# Patient Record
Sex: FEMALE | Race: WHITE | URBAN - METROPOLITAN AREA
[De-identification: names, ages, dates, MRNs, and addresses within clinical notes are randomized per-mention and may not be internally consistent; named-entity substitution may affect disease eponyms.]

---

## 2017-04-03 ENCOUNTER — HISTORICAL (OUTPATIENT)
Dept: HEMATOLOGY/ONCOLOGY | Facility: CLINIC | Age: 62
End: 2017-04-03

## 2017-04-14 ENCOUNTER — HISTORICAL (OUTPATIENT)
Dept: ADMINISTRATIVE | Facility: HOSPITAL | Age: 62
End: 2017-04-14

## 2017-07-05 ENCOUNTER — HISTORICAL (OUTPATIENT)
Dept: RADIOLOGY | Facility: HOSPITAL | Age: 62
End: 2017-07-05

## 2017-07-11 ENCOUNTER — HISTORICAL (OUTPATIENT)
Dept: ADMINISTRATIVE | Facility: HOSPITAL | Age: 62
End: 2017-07-11

## 2017-08-15 ENCOUNTER — HISTORICAL (OUTPATIENT)
Dept: RADIOLOGY | Facility: HOSPITAL | Age: 62
End: 2017-08-15

## 2017-10-05 ENCOUNTER — HISTORICAL (OUTPATIENT)
Dept: ADMINISTRATIVE | Facility: HOSPITAL | Age: 62
End: 2017-10-05

## 2017-10-05 LAB
ABS NEUT (OLG): 5.23 X10(3)/MCL (ref 2.1–9.2)
ALBUMIN SERPL-MCNC: 3.6 GM/DL (ref 3.4–5)
ALBUMIN/GLOB SERPL: 1.2 {RATIO}
ALP SERPL-CCNC: 210 UNIT/L (ref 38–126)
ALT SERPL-CCNC: 19 UNIT/L (ref 12–78)
AST SERPL-CCNC: 9 UNIT/L (ref 15–37)
BASOPHILS # BLD AUTO: 0.1 X10(3)/MCL (ref 0–0.2)
BASOPHILS NFR BLD AUTO: 0.7 %
BILIRUB SERPL-MCNC: 0.4 MG/DL (ref 0.2–1)
BILIRUBIN DIRECT+TOT PNL SERPL-MCNC: 0.1 MG/DL (ref 0–0.2)
BILIRUBIN DIRECT+TOT PNL SERPL-MCNC: 0.3 MG/DL (ref 0–0.8)
BUN SERPL-MCNC: 25 MG/DL (ref 7–18)
CALCIUM SERPL-MCNC: 9.2 MG/DL (ref 8.5–10.1)
CHLORIDE SERPL-SCNC: 99 MMOL/L (ref 98–107)
CO2 SERPL-SCNC: 22 MMOL/L (ref 21–32)
CREAT SERPL-MCNC: 1.47 MG/DL (ref 0.55–1.02)
EOSINOPHIL # BLD AUTO: 0.3 X10(3)/MCL (ref 0–0.9)
EOSINOPHIL NFR BLD AUTO: 3.7 %
ERYTHROCYTE [DISTWIDTH] IN BLOOD BY AUTOMATED COUNT: 14.4 % (ref 11.5–17)
FERRITIN SERPL-MCNC: 593.4 NG/ML (ref 8–388)
FOLATE SERPL-MCNC: 6.8 NG/ML (ref 3.1–17.5)
GLOBULIN SER-MCNC: 3 GM/DL (ref 2.4–3.5)
GLUCOSE SERPL-MCNC: 431 MG/DL (ref 74–106)
HCT VFR BLD AUTO: 33.4 % (ref 37–47)
HGB BLD-MCNC: 10.9 GM/DL (ref 12–16)
IRON SATN MFR SERPL: 21.3 % (ref 20–50)
IRON SERPL-MCNC: 60 MCG/DL (ref 50–175)
LYMPHOCYTES # BLD AUTO: 2 X10(3)/MCL (ref 0.6–4.6)
LYMPHOCYTES NFR BLD AUTO: 23.5 %
MCH RBC QN AUTO: 28.1 PG (ref 27–31)
MCHC RBC AUTO-ENTMCNC: 32.6 GM/DL (ref 33–36)
MCV RBC AUTO: 86.1 FL (ref 80–94)
MONOCYTES # BLD AUTO: 0.8 X10(3)/MCL (ref 0.1–1.3)
MONOCYTES NFR BLD AUTO: 9.6 %
NEUTROPHILS # BLD AUTO: 5.2 X10(3)/MCL (ref 2.1–9.2)
NEUTROPHILS NFR BLD AUTO: 62.5 %
PLATELET # BLD AUTO: 228 X10(3)/MCL (ref 130–400)
PMV BLD AUTO: 10.8 FL (ref 9.4–12.4)
POTASSIUM SERPL-SCNC: 4.7 MMOL/L (ref 3.5–5.1)
PROT SERPL-MCNC: 6.6 GM/DL (ref 6.4–8.2)
RBC # BLD AUTO: 3.88 X10(6)/MCL (ref 4.2–5.4)
SODIUM SERPL-SCNC: 132 MMOL/L (ref 136–145)
TIBC SERPL-MCNC: 282 MCG/DL (ref 250–450)
TRANSFERRIN SERPL-MCNC: 228 MG/DL (ref 200–360)
VIT B12 SERPL-MCNC: 346 PG/ML (ref 193–986)
WBC # SPEC AUTO: 8.4 X10(3)/MCL (ref 4.5–11.5)

## 2017-10-20 ENCOUNTER — HISTORICAL (OUTPATIENT)
Dept: RADIOLOGY | Facility: HOSPITAL | Age: 62
End: 2017-10-20

## 2017-12-01 ENCOUNTER — HISTORICAL (OUTPATIENT)
Dept: ADMINISTRATIVE | Facility: HOSPITAL | Age: 62
End: 2017-12-01

## 2017-12-01 LAB
ALBUMIN SERPL-MCNC: 3.4 GM/DL (ref 3.4–5)
ALBUMIN/GLOB SERPL: 1 RATIO (ref 1.1–2)
ALP SERPL-CCNC: 209 UNIT/L (ref 38–126)
ALT SERPL-CCNC: 20 UNIT/L (ref 12–78)
AST SERPL-CCNC: 15 UNIT/L (ref 15–37)
BILIRUB SERPL-MCNC: 0.3 MG/DL (ref 0.2–1)
BILIRUBIN DIRECT+TOT PNL SERPL-MCNC: 0.1 MG/DL (ref 0–0.5)
BILIRUBIN DIRECT+TOT PNL SERPL-MCNC: 0.2 MG/DL (ref 0–0.8)
BUN SERPL-MCNC: 22 MG/DL (ref 7–18)
CALCIUM SERPL-MCNC: 9 MG/DL (ref 8.5–10.1)
CHLORIDE SERPL-SCNC: 106 MMOL/L (ref 98–107)
CO2 SERPL-SCNC: 20 MMOL/L (ref 21–32)
CREAT SERPL-MCNC: 1.44 MG/DL (ref 0.55–1.02)
ERYTHROCYTE [DISTWIDTH] IN BLOOD BY AUTOMATED COUNT: 14.5 % (ref 11.5–17)
GLOBULIN SER-MCNC: 3.4 GM/DL (ref 2.4–3.5)
GLUCOSE SERPL-MCNC: 254 MG/DL (ref 74–106)
HCT VFR BLD AUTO: 35.7 % (ref 37–47)
HGB BLD-MCNC: 11.5 GM/DL (ref 12–16)
MCH RBC QN AUTO: 27.4 PG (ref 27–31)
MCHC RBC AUTO-ENTMCNC: 32.2 GM/DL (ref 33–36)
MCV RBC AUTO: 85 FL (ref 80–94)
PLATELET # BLD AUTO: 225 X10(3)/MCL (ref 130–400)
PMV BLD AUTO: 10.6 FL (ref 9.4–12.4)
POTASSIUM SERPL-SCNC: 4.3 MMOL/L (ref 3.5–5.1)
PROT SERPL-MCNC: 6.8 GM/DL (ref 6.4–8.2)
RBC # BLD AUTO: 4.2 X10(6)/MCL (ref 4.2–5.4)
SODIUM SERPL-SCNC: 139 MMOL/L (ref 136–145)
WBC # SPEC AUTO: 8.6 X10(3)/MCL (ref 4.5–11.5)

## 2017-12-19 ENCOUNTER — HISTORICAL (OUTPATIENT)
Dept: ADMINISTRATIVE | Facility: HOSPITAL | Age: 62
End: 2017-12-19

## 2018-04-23 ENCOUNTER — HISTORICAL (OUTPATIENT)
Dept: ADMINISTRATIVE | Facility: HOSPITAL | Age: 63
End: 2018-04-23

## 2018-04-23 LAB
ABS NEUT (OLG): 9.9 X10(3)/MCL (ref 2.1–9.2)
ALBUMIN SERPL-MCNC: 3.3 GM/DL (ref 3.4–5)
ALBUMIN/GLOB SERPL: 1 {RATIO}
ALP SERPL-CCNC: 198 UNIT/L (ref 45–117)
ALT SERPL-CCNC: 20 UNIT/L (ref 13–56)
AST SERPL-CCNC: 13 UNIT/L (ref 15–37)
BASOPHILS # BLD AUTO: 0.07 X10(3)/MCL (ref 0–0.2)
BASOPHILS NFR BLD AUTO: 0.6 % (ref 0–1)
BILIRUB SERPL-MCNC: 0.4 MG/DL (ref 0.2–1)
BILIRUBIN DIRECT+TOT PNL SERPL-MCNC: <0.1 MG/DL (ref 0–0.2)
BILIRUBIN DIRECT+TOT PNL SERPL-MCNC: >0.3 MG/DL (ref 0–1)
BUN SERPL-MCNC: 20 MG/DL (ref 7–18)
CALCIUM SERPL-MCNC: 9.4 MG/DL (ref 8.5–10.1)
CHLORIDE SERPL-SCNC: 104 MMOL/L (ref 98–107)
CO2 SERPL-SCNC: 21 MMOL/L (ref 21–32)
CREAT SERPL-MCNC: 1.53 MG/DL (ref 0.55–1.02)
DEPRECATED CALCIDIOL+CALCIFEROL SERPL-MC: 7 NG/ML (ref 30–80)
EOSINOPHIL # BLD AUTO: 0.01 X10(3)/MCL (ref 0–0.9)
EOSINOPHIL NFR BLD AUTO: 0.1 % (ref 0–6.4)
ERYTHROCYTE [DISTWIDTH] IN BLOOD BY AUTOMATED COUNT: 14.5 % (ref 11.5–17)
ERYTHROCYTE [SEDIMENTATION RATE] IN BLOOD: 54 MM/HR (ref 0–30)
GLOBULIN SER-MCNC: 4 GM/DL (ref 2–4)
GLUCOSE SERPL-MCNC: 402 MG/DL (ref 74–106)
HCT VFR BLD AUTO: 37 % (ref 37–47)
HGB BLD-MCNC: 12 GM/DL (ref 12–16)
IMM GRANULOCYTES # BLD AUTO: 0.33 10*3/UL (ref 0–0.02)
IMM GRANULOCYTES NFR BLD AUTO: 2.9 % (ref 0–0.43)
LYMPHOCYTES # BLD AUTO: 0.92 X10(3)/MCL (ref 0.6–4.6)
LYMPHOCYTES NFR BLD AUTO: 8.1 % (ref 16–44)
MAGNESIUM SERPL-MCNC: 1.8 MG/DL (ref 1.8–2.4)
MCH RBC QN AUTO: 28 PG (ref 27–31)
MCHC RBC AUTO-ENTMCNC: 32.4 GM/DL (ref 33–36)
MCV RBC AUTO: 86.4 FL (ref 80–94)
MONOCYTES # BLD AUTO: 0.09 X10(3)/MCL (ref 0.1–1.3)
MONOCYTES NFR BLD AUTO: 0.8 % (ref 4–12.1)
NEUTROPHILS # BLD AUTO: 9.9 X10(3)/MCL (ref 2.1–9.2)
NEUTROPHILS NFR BLD AUTO: 87.5 % (ref 43–73)
NRBC BLD AUTO-RTO: 0 % (ref 0–0.2)
PLATELET # BLD AUTO: 255 X10(3)/MCL (ref 130–400)
PMV BLD AUTO: 11.2 FL (ref 7.4–10.4)
POC CREATININE: 1.4 MG/DL (ref 0.6–1.3)
POTASSIUM SERPL-SCNC: 5.1 MMOL/L (ref 3.5–5.1)
PROT SERPL-MCNC: 7.1 GM/DL (ref 6.4–8.2)
RBC # BLD AUTO: 4.28 X10(6)/MCL (ref 4.2–5.4)
SODIUM SERPL-SCNC: 135 MMOL/L (ref 136–145)
WBC # SPEC AUTO: 11.3 X10(3)/MCL (ref 4.5–11.5)

## 2018-08-22 LAB — RAPID GROUP A STREP (OHS): POSITIVE

## 2018-09-06 ENCOUNTER — HISTORICAL (OUTPATIENT)
Dept: ADMINISTRATIVE | Facility: HOSPITAL | Age: 63
End: 2018-09-06

## 2018-09-06 LAB
ALBUMIN SERPL-MCNC: 3.2 GM/DL (ref 3.4–5)
ALBUMIN/GLOB SERPL: 1.1 {RATIO}
ALP SERPL-CCNC: 144 UNIT/L (ref 38–126)
ALT SERPL-CCNC: 20 UNIT/L (ref 12–78)
APPEARANCE, UA: CLEAR
AST SERPL-CCNC: 15 UNIT/L (ref 15–37)
BACTERIA #/AREA URNS AUTO: ABNORMAL /HPF
BILIRUB SERPL-MCNC: 0.3 MG/DL (ref 0.2–1)
BILIRUB UR QL STRIP: NEGATIVE
BILIRUBIN DIRECT+TOT PNL SERPL-MCNC: 0.1 MG/DL (ref 0–0.2)
BILIRUBIN DIRECT+TOT PNL SERPL-MCNC: 0.2 MG/DL (ref 0–0.8)
BUN SERPL-MCNC: 31 MG/DL (ref 7–18)
CALCIUM SERPL-MCNC: 8.9 MG/DL (ref 8.5–10.1)
CHLORIDE SERPL-SCNC: 108 MMOL/L (ref 98–107)
CK SERPL-CCNC: 28 UNIT/L (ref 26–192)
CO2 SERPL-SCNC: 23 MMOL/L (ref 21–32)
COLOR UR: YELLOW
CREAT SERPL-MCNC: 1.49 MG/DL (ref 0.55–1.02)
CRP SERPL HS-MCNC: 22.5 MG/L (ref 0–3)
DEPRECATED CALCIDIOL+CALCIFEROL SERPL-MC: 10.77 NG/ML (ref 30–80)
ERYTHROCYTE [SEDIMENTATION RATE] IN BLOOD: 39 MM/HR (ref 0–20)
EST. AVERAGE GLUCOSE BLD GHB EST-MCNC: 220 MG/DL
GLOBULIN SER-MCNC: 3 GM/DL (ref 2.4–3.5)
GLUCOSE (UA): ABNORMAL
GLUCOSE SERPL-MCNC: 222 MG/DL (ref 74–106)
HBA1C MFR BLD: 9.3 % (ref 4.2–6.3)
HGB UR QL STRIP: NEGATIVE
KETONES UR QL STRIP: NEGATIVE
LEUKOCYTE ESTERASE UR QL STRIP: NEGATIVE
NITRITE UR QL STRIP.AUTO: NEGATIVE
PH UR STRIP: 5.5 [PH] (ref 5–9)
POTASSIUM SERPL-SCNC: 4.9 MMOL/L (ref 3.5–5.1)
PROT SERPL-MCNC: 6.2 GM/DL (ref 6.4–8.2)
PROT UR QL STRIP: ABNORMAL
RBC #/AREA URNS HPF: ABNORMAL /[HPF]
SODIUM SERPL-SCNC: 138 MMOL/L (ref 136–145)
SP GR UR STRIP: 1.02 (ref 1–1.03)
SQUAMOUS EPITHELIAL, UA: ABNORMAL
TSH SERPL-ACNC: 2.27 MIU/L (ref 0.36–3.74)
UROBILINOGEN UR STRIP-ACNC: 0.2
WBC #/AREA URNS AUTO: ABNORMAL /HPF (ref 0–3)

## 2018-12-07 ENCOUNTER — HISTORICAL (OUTPATIENT)
Dept: LAB | Facility: HOSPITAL | Age: 63
End: 2018-12-07

## 2018-12-07 LAB
ABS NEUT (OLG): 7.71 X10(3)/MCL (ref 2.1–9.2)
ALBUMIN SERPL-MCNC: 3.3 GM/DL (ref 3.4–5)
ALBUMIN/GLOB SERPL: 1.1 {RATIO}
ALP SERPL-CCNC: 143 UNIT/L (ref 38–126)
ALT SERPL-CCNC: 18 UNIT/L (ref 12–78)
AST SERPL-CCNC: 15 UNIT/L (ref 15–37)
BASOPHILS # BLD AUTO: 0.1 X10(3)/MCL (ref 0–0.2)
BASOPHILS NFR BLD AUTO: 1 %
BILIRUB SERPL-MCNC: 0.2 MG/DL (ref 0.2–1)
BILIRUBIN DIRECT+TOT PNL SERPL-MCNC: 0.1 MG/DL (ref 0–0.2)
BILIRUBIN DIRECT+TOT PNL SERPL-MCNC: 0.1 MG/DL (ref 0–0.8)
BUN SERPL-MCNC: 35 MG/DL (ref 7–18)
CALCIUM SERPL-MCNC: 9.1 MG/DL (ref 8.5–10.1)
CHLORIDE SERPL-SCNC: 105 MMOL/L (ref 98–107)
CO2 SERPL-SCNC: 21 MMOL/L (ref 21–32)
CREAT SERPL-MCNC: 2.01 MG/DL (ref 0.55–1.02)
DEPRECATED CALCIDIOL+CALCIFEROL SERPL-MC: 29.37 NG/ML (ref 30–80)
EOSINOPHIL # BLD AUTO: 0.5 X10(3)/MCL (ref 0–0.9)
EOSINOPHIL NFR BLD AUTO: 4 %
ERYTHROCYTE [DISTWIDTH] IN BLOOD BY AUTOMATED COUNT: 14.6 % (ref 11.5–17)
ERYTHROCYTE [SEDIMENTATION RATE] IN BLOOD: 63 MM/HR (ref 0–20)
GLOBULIN SER-MCNC: 3 GM/DL (ref 2.4–3.5)
GLUCOSE SERPL-MCNC: 216 MG/DL (ref 74–106)
HCT VFR BLD AUTO: 38.3 % (ref 37–47)
HGB BLD-MCNC: 11.6 GM/DL (ref 12–16)
LYMPHOCYTES # BLD AUTO: 2.4 X10(3)/MCL (ref 0.6–4.6)
LYMPHOCYTES NFR BLD AUTO: 21 %
MAGNESIUM SERPL-MCNC: 1.7 MG/DL (ref 1.8–2.4)
MCH RBC QN AUTO: 27.4 PG (ref 27–31)
MCHC RBC AUTO-ENTMCNC: 30.3 GM/DL (ref 33–36)
MCV RBC AUTO: 90.3 FL (ref 80–94)
MONOCYTES # BLD AUTO: 0.8 X10(3)/MCL (ref 0.1–1.3)
MONOCYTES NFR BLD AUTO: 7 %
NEUTROPHILS # BLD AUTO: 7.71 X10(3)/MCL (ref 2.1–9.2)
NEUTROPHILS NFR BLD AUTO: 66 %
PLATELET # BLD AUTO: 258 X10(3)/MCL (ref 130–400)
PMV BLD AUTO: 12.1 FL (ref 9.4–12.4)
POTASSIUM SERPL-SCNC: 5.1 MMOL/L (ref 3.5–5.1)
PROT SERPL-MCNC: 6.3 GM/DL (ref 6.4–8.2)
RBC # BLD AUTO: 4.24 X10(6)/MCL (ref 4.2–5.4)
SODIUM SERPL-SCNC: 136 MMOL/L (ref 136–145)
WBC # SPEC AUTO: 11.7 X10(3)/MCL (ref 4.5–11.5)

## 2018-12-21 ENCOUNTER — HISTORICAL (OUTPATIENT)
Dept: RADIOLOGY | Facility: HOSPITAL | Age: 63
End: 2018-12-21

## 2019-01-16 ENCOUNTER — HISTORICAL (OUTPATIENT)
Dept: ADMINISTRATIVE | Facility: HOSPITAL | Age: 64
End: 2019-01-16

## 2019-01-16 LAB
ALBUMIN SERPL-MCNC: 2.9 GM/DL (ref 3.4–5)
ALBUMIN/GLOB SERPL: 1 RATIO (ref 1.1–2)
ALP SERPL-CCNC: 156 UNIT/L (ref 38–126)
ALT SERPL-CCNC: 16 UNIT/L (ref 12–78)
AST SERPL-CCNC: 13 UNIT/L (ref 15–37)
BILIRUB SERPL-MCNC: 0.3 MG/DL (ref 0.2–1)
BILIRUBIN DIRECT+TOT PNL SERPL-MCNC: 0 MG/DL (ref 0–0.5)
BILIRUBIN DIRECT+TOT PNL SERPL-MCNC: 0.3 MG/DL (ref 0–0.8)
BUN SERPL-MCNC: 29 MG/DL (ref 7–18)
CALCIUM SERPL-MCNC: 8.8 MG/DL (ref 8.5–10.1)
CHLORIDE SERPL-SCNC: 105 MMOL/L (ref 98–107)
CO2 SERPL-SCNC: 23 MMOL/L (ref 21–32)
CREAT SERPL-MCNC: 2.2 MG/DL (ref 0.55–1.02)
ERYTHROCYTE [DISTWIDTH] IN BLOOD BY AUTOMATED COUNT: 14.6 % (ref 11.5–17)
GLOBULIN SER-MCNC: 3 GM/DL (ref 2.4–3.5)
GLUCOSE SERPL-MCNC: 297 MG/DL (ref 74–106)
HCT VFR BLD AUTO: 34.8 % (ref 37–47)
HGB BLD-MCNC: 10.7 GM/DL (ref 12–16)
MCH RBC QN AUTO: 27.9 PG (ref 27–31)
MCHC RBC AUTO-ENTMCNC: 30.7 GM/DL (ref 33–36)
MCV RBC AUTO: 90.6 FL (ref 80–94)
PLATELET # BLD AUTO: 222 X10(3)/MCL (ref 130–400)
PMV BLD AUTO: 11.5 FL (ref 9.4–12.4)
POTASSIUM SERPL-SCNC: 4.4 MMOL/L (ref 3.5–5.1)
PROT SERPL-MCNC: 5.9 GM/DL (ref 6.4–8.2)
RBC # BLD AUTO: 3.84 X10(6)/MCL (ref 4.2–5.4)
SODIUM SERPL-SCNC: 136 MMOL/L (ref 136–145)
WBC # SPEC AUTO: 7.8 X10(3)/MCL (ref 4.5–11.5)

## 2019-03-14 ENCOUNTER — HISTORICAL (OUTPATIENT)
Dept: RADIOLOGY | Facility: HOSPITAL | Age: 64
End: 2019-03-14

## 2019-04-10 ENCOUNTER — HISTORICAL (OUTPATIENT)
Dept: RADIOLOGY | Facility: HOSPITAL | Age: 64
End: 2019-04-10

## 2019-05-06 ENCOUNTER — HISTORICAL (OUTPATIENT)
Dept: RADIOLOGY | Facility: HOSPITAL | Age: 64
End: 2019-05-06

## 2019-05-06 LAB
ABS NEUT (OLG): 7.11 X10(3)/MCL (ref 2.1–9.2)
ALBUMIN SERPL-MCNC: 3.4 GM/DL (ref 3.4–5)
ALBUMIN/GLOB SERPL: 0.8 {RATIO}
ALP SERPL-CCNC: 162 UNIT/L (ref 45–117)
ALT SERPL-CCNC: 18 UNIT/L (ref 13–56)
APPEARANCE, UA: ABNORMAL
AST SERPL-CCNC: 13 UNIT/L (ref 15–37)
BACTERIA SPEC CULT: ABNORMAL
BILIRUB SERPL-MCNC: 0.4 MG/DL (ref 0.2–1)
BILIRUB UR QL STRIP: NEGATIVE
BILIRUBIN DIRECT+TOT PNL SERPL-MCNC: <0.1 MG/DL (ref 0–0.2)
BILIRUBIN DIRECT+TOT PNL SERPL-MCNC: >0.3 MG/DL (ref 0–1)
BUN SERPL-MCNC: 22 MG/DL (ref 7–18)
CALCIUM SERPL-MCNC: 8.5 MG/DL (ref 8.5–10.1)
CHLORIDE SERPL-SCNC: 111 MMOL/L (ref 98–107)
CO2 SERPL-SCNC: 21 MMOL/L (ref 21–32)
COLOR UR: YELLOW
CREAT SERPL-MCNC: 1.83 MG/DL (ref 0.55–1.02)
DEPRECATED CALCIDIOL+CALCIFEROL SERPL-MC: 11.65 NG/ML (ref 30–80)
ERYTHROCYTE [DISTWIDTH] IN BLOOD BY AUTOMATED COUNT: 14.2 % (ref 11.5–17)
GLOBULIN SER-MCNC: 4 GM/DL (ref 2–4)
GLUCOSE (UA): ABNORMAL
GLUCOSE SERPL-MCNC: 164 MG/DL (ref 74–106)
HCT VFR BLD AUTO: 36.6 % (ref 37–47)
HGB BLD-MCNC: 11.8 GM/DL (ref 12–16)
HGB UR QL STRIP: ABNORMAL
HYALINE CASTS #/AREA URNS LPF: ABNORMAL /LPF
KETONES UR QL STRIP: NEGATIVE
LEUKOCYTE ESTERASE UR QL STRIP: NEGATIVE
MCH RBC QN AUTO: 28.6 PG (ref 27–31)
MCHC RBC AUTO-ENTMCNC: 32.2 GM/DL (ref 33–36)
MCV RBC AUTO: 88.8 FL (ref 80–94)
MUCOUS THREADS URNS QL MICRO: ABNORMAL /LPF
NITRITE UR QL STRIP: NEGATIVE
PH UR STRIP: 6 [PH] (ref 5–7)
PLATELET # BLD AUTO: 214 X10(3)/MCL (ref 130–400)
PMV BLD AUTO: 12 FL (ref 7.4–10.4)
POTASSIUM SERPL-SCNC: 4.7 MMOL/L (ref 3.5–5.1)
PROT SERPL-MCNC: 7.1 GM/DL (ref 6.4–8.2)
PROT UR QL STRIP: ABNORMAL
RBC # BLD AUTO: 4.12 X10(6)/MCL (ref 4.2–5.4)
RBC #/AREA URNS HPF: ABNORMAL /HPF
SODIUM SERPL-SCNC: 139 MMOL/L (ref 136–145)
SP GR UR STRIP: 1.02 (ref 1–1.03)
SQUAMOUS EPITHELIAL, UA: ABNORMAL /LPF
TSH SERPL-ACNC: 2.91 MIU/ML (ref 0.36–3.74)
UROBILINOGEN UR STRIP-ACNC: NEGATIVE
WBC # SPEC AUTO: 11.2 X10(3)/MCL (ref 4.5–11.5)
WBC #/AREA URNS HPF: ABNORMAL /HPF

## 2019-11-22 ENCOUNTER — HISTORICAL (OUTPATIENT)
Dept: ADMINISTRATIVE | Facility: HOSPITAL | Age: 64
End: 2019-11-22

## 2019-12-12 ENCOUNTER — HISTORICAL (OUTPATIENT)
Dept: ADMINISTRATIVE | Facility: HOSPITAL | Age: 64
End: 2019-12-12

## 2019-12-12 LAB
ALBUMIN SERPL-MCNC: 3.6 GM/DL (ref 3.4–5)
ALBUMIN/GLOB SERPL: 1.1 {RATIO}
ALP SERPL-CCNC: 145 UNIT/L (ref 38–126)
ALT SERPL-CCNC: 18 UNIT/L (ref 12–78)
APPEARANCE, UA: CLEAR
AST SERPL-CCNC: 13 UNIT/L (ref 15–37)
BACTERIA #/AREA URNS AUTO: ABNORMAL /HPF
BILIRUB SERPL-MCNC: 0.6 MG/DL (ref 0.2–1)
BILIRUB UR QL STRIP: NEGATIVE
BILIRUBIN DIRECT+TOT PNL SERPL-MCNC: 0.1 MG/DL (ref 0–0.2)
BILIRUBIN DIRECT+TOT PNL SERPL-MCNC: 0.5 MG/DL (ref 0–0.8)
BUN SERPL-MCNC: 31 MG/DL (ref 7–18)
CALCIUM SERPL-MCNC: 9.2 MG/DL (ref 8.5–10.1)
CHLORIDE SERPL-SCNC: 109 MMOL/L (ref 98–107)
CHOLEST SERPL-MCNC: 313 MG/DL (ref 0–200)
CHOLEST/HDLC SERPL: 6.4 {RATIO} (ref 0–4)
CO2 SERPL-SCNC: 21 MMOL/L (ref 21–32)
COLOR UR: YELLOW
CREAT SERPL-MCNC: 2.52 MG/DL (ref 0.55–1.02)
CRP SERPL HS-MCNC: 13 MG/L (ref 0–3)
DEPRECATED CALCIDIOL+CALCIFEROL SERPL-MC: 17.92 NG/ML (ref 30–80)
ERYTHROCYTE [DISTWIDTH] IN BLOOD BY AUTOMATED COUNT: 14.4 % (ref 11.5–17)
EST. AVERAGE GLUCOSE BLD GHB EST-MCNC: 186 MG/DL
GLOBULIN SER-MCNC: 3.2 GM/DL (ref 2.4–3.5)
GLUCOSE (UA): ABNORMAL
GLUCOSE SERPL-MCNC: 170 MG/DL (ref 74–106)
HBA1C MFR BLD: 8.1 % (ref 4.2–6.3)
HCT VFR BLD AUTO: 41 % (ref 37–47)
HDLC SERPL-MCNC: 49 MG/DL (ref 35–60)
HGB BLD-MCNC: 12.9 GM/DL (ref 12–16)
HGB UR QL STRIP: ABNORMAL
KETONES UR QL STRIP: NEGATIVE
LDLC SERPL CALC-MCNC: 202 MG/DL (ref 0–129)
LEUKOCYTE ESTERASE UR QL STRIP: NEGATIVE
MCH RBC QN AUTO: 28 PG (ref 27–31)
MCHC RBC AUTO-ENTMCNC: 31.5 GM/DL (ref 33–36)
MCV RBC AUTO: 88.9 FL (ref 80–94)
NITRITE UR QL STRIP.AUTO: NEGATIVE
PH UR STRIP: 6 [PH] (ref 5–9)
PLATELET # BLD AUTO: 195 X10(3)/MCL (ref 130–400)
PMV BLD AUTO: 12.6 FL (ref 9.4–12.4)
POTASSIUM SERPL-SCNC: 5 MMOL/L (ref 3.5–5.1)
PROT SERPL-MCNC: 6.8 GM/DL (ref 6.4–8.2)
PROT UR QL STRIP: ABNORMAL
RBC # BLD AUTO: 4.61 X10(6)/MCL (ref 4.2–5.4)
RBC #/AREA URNS HPF: ABNORMAL /[HPF]
SODIUM SERPL-SCNC: 138 MMOL/L (ref 136–145)
SP GR UR STRIP: 1.02 (ref 1–1.03)
SQUAMOUS EPITHELIAL, UA: ABNORMAL
TRIGL SERPL-MCNC: 309 MG/DL (ref 30–150)
TSH SERPL-ACNC: 1.98 MIU/L (ref 0.36–3.74)
UROBILINOGEN UR STRIP-ACNC: 1
VLDLC SERPL CALC-MCNC: 62 MG/DL
WBC # SPEC AUTO: 9.3 X10(3)/MCL (ref 4.5–11.5)
WBC #/AREA URNS AUTO: ABNORMAL /HPF (ref 0–3)

## 2019-12-14 LAB — FINAL CULTURE: NORMAL

## 2020-03-25 ENCOUNTER — HISTORICAL (OUTPATIENT)
Dept: ADMINISTRATIVE | Facility: HOSPITAL | Age: 65
End: 2020-03-25

## 2020-03-25 LAB
DEPRECATED CALCIDIOL+CALCIFEROL SERPL-MC: 18 NG/ML (ref 6.6–49.9)
ERYTHROCYTE [DISTWIDTH] IN BLOOD BY AUTOMATED COUNT: 14.1 % (ref 11.5–17)
FERRITIN SERPL-MCNC: 426.24 NG/ML (ref 4.63–204)
HCT VFR BLD AUTO: 30.8 % (ref 37–47)
HGB BLD-MCNC: 9.2 GM/DL (ref 12–16)
IRON SATN MFR SERPL: 14 %
IRON SERPL-MCNC: 32 UG/DL (ref 50–170)
MCH RBC QN AUTO: 28.3 PG (ref 27–31)
MCHC RBC AUTO-ENTMCNC: 29.9 GM/DL (ref 33–36)
MCV RBC AUTO: 94.8 FL (ref 80–94)
PLATELET # BLD AUTO: 216 X10(3)/MCL (ref 130–400)
PMV BLD AUTO: 11.9 FL (ref 9.4–12.4)
PROT 24H UR-MCNC: 1168 MG/24HR (ref 0–149)
PTH-INTACT SERPL-MCNC: 455.3 PG/ML (ref 8.7–77.1)
RBC # BLD AUTO: 3.25 X10(6)/MCL (ref 4.2–5.4)
TIBC SERPL-MCNC: 200 UG/DL (ref 70–310)
TIBC SERPL-MCNC: 232 UG/DL
TRANSFERRIN SERPL-MCNC: 205 MG/DL (ref 173–360)
URATE SERPL-MCNC: 3.3 MG/DL (ref 2.7–6)
WBC # SPEC AUTO: 7.8 X10(3)/MCL (ref 4.5–11.5)

## 2020-04-01 ENCOUNTER — HISTORICAL (OUTPATIENT)
Dept: LAB | Facility: HOSPITAL | Age: 65
End: 2020-04-01

## 2020-04-01 LAB
ABS NEUT (OLG): 9.06 X10(3)/MCL (ref 2.1–9.2)
ALBUMIN SERPL-MCNC: 3.5 GM/DL (ref 3.4–5)
ALBUMIN/GLOB SERPL: 1.2 RATIO (ref 1.1–2)
ALP SERPL-CCNC: 164 UNIT/L (ref 40–150)
ALT SERPL-CCNC: 7 UNIT/L (ref 0–55)
AMMONIA PLAS-MSCNC: 54.6 UMOL/L (ref 18–70)
AST SERPL-CCNC: 10 UNIT/L (ref 5–34)
BASOPHILS # BLD AUTO: 0.1 X10(3)/MCL (ref 0–0.2)
BASOPHILS NFR BLD AUTO: 1 %
BILIRUB SERPL-MCNC: 0.5 MG/DL
BILIRUBIN DIRECT+TOT PNL SERPL-MCNC: 0.2 MG/DL (ref 0–0.5)
BILIRUBIN DIRECT+TOT PNL SERPL-MCNC: 0.3 MG/DL (ref 0–0.8)
BUN SERPL-MCNC: 21 MG/DL (ref 9.8–20.1)
CALCIUM SERPL-MCNC: 8.8 MG/DL (ref 8.4–10.2)
CHLORIDE SERPL-SCNC: 113 MMOL/L (ref 98–107)
CO2 SERPL-SCNC: 16 MMOL/L (ref 23–31)
CREAT SERPL-MCNC: 3.6 MG/DL (ref 0.55–1.02)
EOSINOPHIL # BLD AUTO: 0.3 X10(3)/MCL (ref 0–0.9)
EOSINOPHIL NFR BLD AUTO: 2 %
ERYTHROCYTE [DISTWIDTH] IN BLOOD BY AUTOMATED COUNT: 14.1 % (ref 11.5–17)
GLOBULIN SER-MCNC: 2.9 GM/DL (ref 2.4–3.5)
GLUCOSE SERPL-MCNC: 300 MG/DL (ref 82–115)
HCT VFR BLD AUTO: 33.8 % (ref 37–47)
HGB BLD-MCNC: 10.4 GM/DL (ref 12–16)
LYMPHOCYTES # BLD AUTO: 1.7 X10(3)/MCL (ref 0.6–4.6)
LYMPHOCYTES NFR BLD AUTO: 14 %
MCH RBC QN AUTO: 28 PG (ref 27–31)
MCHC RBC AUTO-ENTMCNC: 30.8 GM/DL (ref 33–36)
MCV RBC AUTO: 90.9 FL (ref 80–94)
MONOCYTES # BLD AUTO: 0.7 X10(3)/MCL (ref 0.1–1.3)
MONOCYTES NFR BLD AUTO: 6 %
NEUTROPHILS # BLD AUTO: 9.06 X10(3)/MCL (ref 2.1–9.2)
NEUTROPHILS NFR BLD AUTO: 76 %
PLATELET # BLD AUTO: 216 X10(3)/MCL (ref 130–400)
PMV BLD AUTO: 12.7 FL (ref 9.4–12.4)
POTASSIUM SERPL-SCNC: 4.9 MMOL/L (ref 3.5–5.1)
PROT SERPL-MCNC: 6.4 GM/DL (ref 5.8–7.6)
RBC # BLD AUTO: 3.72 X10(6)/MCL (ref 4.2–5.4)
SODIUM SERPL-SCNC: 140 MMOL/L (ref 136–145)
WBC # SPEC AUTO: 12 X10(3)/MCL (ref 4.5–11.5)

## 2020-06-01 ENCOUNTER — HISTORICAL (OUTPATIENT)
Dept: RADIOLOGY | Facility: HOSPITAL | Age: 65
End: 2020-06-01

## 2020-07-02 ENCOUNTER — HISTORICAL (OUTPATIENT)
Dept: ADMINISTRATIVE | Facility: HOSPITAL | Age: 65
End: 2020-07-02

## 2020-07-02 LAB
ALBUMIN SERPL-MCNC: 3.2 GM/DL (ref 3.4–5)
ALBUMIN/GLOB SERPL: 1.2 RATIO (ref 1.1–2)
ALP SERPL-CCNC: 94 UNIT/L (ref 40–150)
ALT SERPL-CCNC: 8 UNIT/L (ref 0–55)
AST SERPL-CCNC: 13 UNIT/L (ref 5–34)
BILIRUB SERPL-MCNC: 0.5 MG/DL
BILIRUBIN DIRECT+TOT PNL SERPL-MCNC: 0.1 MG/DL (ref 0–0.5)
BILIRUBIN DIRECT+TOT PNL SERPL-MCNC: 0.4 MG/DL (ref 0–0.8)
BUN SERPL-MCNC: 25.5 MG/DL (ref 9.8–20.1)
CALCIUM SERPL-MCNC: 8.7 MG/DL (ref 8.4–10.2)
CHLORIDE SERPL-SCNC: 111 MMOL/L (ref 98–107)
CO2 SERPL-SCNC: 21 MMOL/L (ref 23–31)
CREAT SERPL-MCNC: 3.04 MG/DL (ref 0.55–1.02)
DEPRECATED CALCIDIOL+CALCIFEROL SERPL-MC: 27 NG/ML (ref 6.6–49.9)
ERYTHROCYTE [DISTWIDTH] IN BLOOD BY AUTOMATED COUNT: 14.8 % (ref 11.5–17)
GLOBULIN SER-MCNC: 2.6 GM/DL (ref 2.4–3.5)
GLUCOSE SERPL-MCNC: 125 MG/DL (ref 82–115)
HCT VFR BLD AUTO: 30.9 % (ref 37–47)
HGB BLD-MCNC: 9.2 GM/DL (ref 12–16)
MCH RBC QN AUTO: 28.8 PG (ref 27–31)
MCHC RBC AUTO-ENTMCNC: 29.8 GM/DL (ref 33–36)
MCV RBC AUTO: 96.6 FL (ref 80–94)
PHOSPHATE SERPL-MCNC: 4.7 MG/DL (ref 2.3–4.7)
PLATELET # BLD AUTO: 246 X10(3)/MCL (ref 130–400)
PMV BLD AUTO: 11.7 FL (ref 9.4–12.4)
POTASSIUM SERPL-SCNC: 4.3 MMOL/L (ref 3.5–5.1)
PROT SERPL-MCNC: 5.8 GM/DL (ref 5.8–7.6)
RBC # BLD AUTO: 3.2 X10(6)/MCL (ref 4.2–5.4)
SODIUM SERPL-SCNC: 143 MMOL/L (ref 136–145)
WBC # SPEC AUTO: 9.9 X10(3)/MCL (ref 4.5–11.5)

## 2022-05-02 NOTE — HISTORICAL [facility] CERNER
This is a historical note converted from [place]. Formatting and pictures may have been removed.  Please reference [place] for original formatting and attached multimedia. Chief Complaint  saw PCP 1 1/2 week ago- felt stuffy, feels worse with cough, chest congestion, nasal congestion, sinus pressure, tried taking tylenol/ tussin dm  History of Present Illness  64-year-old present to clinic with a history of?chest congestion, coughing, productive?yellow-green mucus associated with nasal congestion, sinus pressure. ?No fever. ?Over-the-counter medications not much help. ?States completed?Levaquin 250 mg as prescribed by primary M.D. 2 weeks ago.  Review of Systems  Constitutional : _ fever , Body aches, Chills  HENT_sore throat,?postnasal drainage  Respiratory_no wheezing, no shortness of breath  Cardiovascular_no chest pain  Gastrointestinal_No?vomiting,?No diarrhea,?No abdominal pain  Musculoskeletal_no joint pain, no joint swelling  Integumentary_no skin rash  Physical Exam  Vitals & Measurements  T:?36.8? ?C (Oral)? HR:?53(Peripheral)? RR:?18? BP:?189/68? SpO2:?97%?  HT:?154?cm? WT:?83.1?kg? BMI:?35.04?  General : Alert and Oriented, No apparent distress, afebrile, coughing,?O2 sats 97%  Neck - supple  HENT : Oropharynx?very mild redness and swelling.? Tonsils absent.?? TMs intact mild fluid no redness.??  Respiratory :?Bilateral equal breath sounds, nonlabored respirations  Cardiovascular : Rate, rhythm regular, normal volume pulse, no murmur  Integumentary : Warm, Dry and no rash  Assessment/Plan  1.?Cough?R05  ?Reviewed chest x-ray, no acute finding.? Cold mist vaporizer and cough drops to keep the airways moist  Antihistamine like Claritin or Zyrtec 10 mg for congestion and medications as directed and as needed  The acute change in symptoms call or return to clinic, follow up with primary MMAHOGANY  Ordered:  benzonatate, 200 mg = 1 cap(s), Oral, TID, PRN PRN as needed for cough, do not crush or chew, X 5 day(s), #  15 cap(s), 0 Refill(s), Pharmacy: [id number]  Office/Outpatient Visit Level 3 Established [id number] PC, Cough, [facility], 19 18:25:00 CST  ?   Problem List/Past Medical History  Ongoing  Chronic anemia  Complex tear of medial meniscus of right knee  DIABETES  GERD  HIGH CHOLESTEROL  Obesity  Primary osteoarthritis of right knee  Right knee pain  SLEEP APNEA  Historical  Anemia  CELLULITES OF THE LEFT ANKLE  DYSPHAGIA  hx of staph  Sepsis  Procedure/Surgical History  Exploration of tendon sheath of hand (10/03/2013)  Release Trigger Finger Or Thumb (Left) (10/03/2013)  Tendon sheath incision (eg, for trigger finger). (10/03/2013)  Tendon sheath incision (eg, for trigger finger). (10/03/2013)  Esophageal manometry (2013)  Esophageal motility (manometric study of the esophagus and/or gastroesophageal junction) study with interpretation and report;. (2013)  Biopsy of bone marrow (2013)  BONE MARROW ASPIRATION PERFORMED WITH BONE MARROW BIOPSY THROUGH THE SAME INCISION ON THE SAME DATE OF SERVICE (2013)  Bone marrow; biopsy, needle or trocar. (2013)  Biopsy of tarsals and metatarsals (2012)  Esophagogastroduodenoscopy [EGD] with closed biopsy (2012)  Central Venous Catheter Placement with Guidance (2012)  carpel tunnel both wrist   delivery delivered  Cholecystectomy  fundiplication  Hernia repair  Hysterectomy sample  lt soulder and elbow surgery  Tonsillectomy   Medications  allopurinol 100 mg oral tablet  AMLODIPINE 5MG Tablets, 5 mg= 1 tab(s), Oral, Daily  CLONIDINE HCL 0.2 MG TABLET, 0.2 mg= 1 tab(s), Oral, BID  ergocalciferol 50,000 intl units (1.25 mg) oral capsule, 81801 IntUnit= 1 cap(s), Oral, qWeek  glimepiride 4 mg oral tablet, 4 mg= 1 tab(s), Oral, BID  HumaLOG  HumaLOG KwikPen 100 units/mL injectable solution, Subcutaneous, TID  Lantus 100 U/mL (per pen)  Linzess 72 mcg oral capsule, 72 mcg= 1 cap(s), Oral, BID  lisinopril 5 mg oral tablet, 5  mg= 1 tab(s), Oral, Daily  Lyrica 100 mg oral capsule, 100 mg= 1 cap(s), Oral, BID  metoprolol TARTrate 50 mg oral tablet (for Lopressor), 50 mg= 1 tab(s), Oral, Daily  ondansetron 4 mg oral tablet, Oral, q4-6hr  PANTOPRAZOLE DR 40MG Tablets, 40 mg= 1 tab(s), Oral, Daily  ranitidine 300 mg oral tablet, 1 tab, Oral, At Bedtime  Singulair 10 mg oral TABLET, 10 mg= 1 tab(s), Oral, Once  Tessalon 200 mg oral capsule, 200 mg= 1 cap(s), Oral, TID, PRN  Tradjenta 5 mg oral tablet, 5 mg= 1 tab(s), Oral, Daily  Allergies  Contrast Dye?(Rash)  Lortab?(Itching)  codeine?(Itching)  iodine topical?(Rash)  morphine?(Tight chest)  vicryl suture  Social History  Abuse/Neglect  No, 11/22/2019  Alcohol - Denies Alcohol Use, 02/20/2013  Never, 06/04/2015  Employment/School - No Risk, 06/04/2015  Employed, Highest education level: University degree(s)., 10/01/2013  Exercise  Exercise frequency: Daily. Self assessment: Fair condition. Exercise type: Walking., 06/04/2015  Home/Environment  Lives with Spouse., 10/01/2013  Nutrition/Health  Diabetic, 06/04/2015  Substance Use - Denies Substance Abuse, 04/18/2013  Never, 06/04/2015  Tobacco - Denies Tobacco Use, 02/20/2013  Never (less than 100 in lifetime), N/A, 11/22/2019  Family History  Alzheimers disease: Father.  CAD (coronary artery disease)....: Mother.  Primary malignant neoplasm of colon: Father.  Immunizations  Vaccine Date Status Comments   pneumococcal 23-polyvalent vaccine 09/21/2014 Given Nursing Judgment   Health Maintenance  Health Maintenance  ???Pending?(in the next year)  ??? ??OverDue  ??? ? ? ?Diabetes Maintenance-Fasting Lipid Profile due??03/17/16??and every 1??year(s)  ??? ? ? ?Depression Screening due??04/06/18??and every 1??year(s)  ??? ? ? ?Alcohol Misuse Screening due??01/01/19??and every 1??year(s)  ??? ? ? ?Diabetes Maintenance-HgbA1c due??09/06/19??and every 1??year(s)  ??? ??Due?  ??? ? ? ?ADL Screening due??11/22/19??and every 1??year(s)  ??? ? ? ?Aspirin  Therapy for CVD Prevention due??11/22/19??and every 1??year(s)  ??? ? ? ?Colorectal Screening due??11/22/19??and every?  ??? ? ? ?Coronary Artery Disease Maintenance-Antiplatelet Agent Prescribed due??11/22/19??and every?  ??? ? ? ?Coronary Artery Disease Maintenance-Lipid Lowering Therapy due??11/22/19??and every?  ??? ? ? ?Diabetes Maintenance-Eye Exam due??11/22/19??and every?  ??? ? ? ?Diabetes Maintenance-Foot Exam due??11/22/19??and every?  ??? ? ? ?Influenza Vaccine due??11/22/19??and every?  ??? ? ? ?Tetanus Vaccine due??11/22/19??and every 10??year(s)  ??? ? ? ?Zoster Vaccine due??11/22/19??and every 100??year(s)  ??? ??Due In Future?  ??? ? ? ?Obesity Screening not due until??01/01/20??and every 1??year(s)  ??? ? ? ?Diabetes Maintenance-Urine Dipstick not due until??05/06/20??and every 1??year(s)  ??? ? ? ?Diabetes Maintenance-Serum Creatinine not due until??05/17/20??and every 1??year(s)  ??? ? ? ?Blood Pressure Screening not due until??11/21/20??and every 1??year(s)  ??? ? ? ?Body Mass Index Check not due until??11/21/20??and every 1??year(s)  ???Satisfied?(in the past 1 year)  ??? ??Satisfied?  ??? ? ? ?Blood Pressure Screening on??11/22/19.??Satisfied by Moo Leach LPN  ??? ? ? ?Body Mass Index Check on??11/22/19.??Satisfied by Moo Leach LPN  ??? ? ? ?Breast Cancer Screening on??03/14/19.??Satisfied by Mariya Read  ??? ? ? ?Diabetes Maintenance-Serum Creatinine on??05/17/19.??Satisfied by Diana Ross MT  ??? ? ? ?Diabetes Maintenance-Urine Dipstick on??05/06/19.??Satisfied by Bebe Leslie  ??? ? ? ?Diabetes Screening on??05/17/19.??Satisfied by Diana Ross MT  ??? ? ? ?Obesity Screening on??11/22/19.??Satisfied by Moo Leach LPN  ?

## 2022-05-02 NOTE — HISTORICAL [facility] CERNER
This is a historical note converted from [facility]. Formatting and pictures may have been removed.  Please reference [place] for original formatting and attached multimedia. Chief Complaint  RIGHT KNEE PAIN  History of Present Illness  This 62-year-old comes in for her right knee.? The patient has been treated for other problems by me in the past.? The patient is complaining of?right knee pain. ?She states that she injured her knee 20 years ago.? She had an MRI at that time and was told that she had?a medial meniscus tear.? She did not have surgery.? She states that recently her symptoms have gotten much worse. ?She is having problems with daily pain and limping.? She has locking and giving way.? The patient has had a calf DVT on the right-hand side in the past.  Review of Systems  Constitutional: No fever, weakness, or fatigue.  Ear/Nose/Mouth/Throat: No nasal congestion or sore throat.  Respiratory: No shortness of breath or cough.  Cardiovascular: No chest pain, palpitations, or peripheral edema.  Gastrointestinal: No nausea, vomiting, or abdominal pain.  Genitourinary: No dysuria.  Musculoskeletal: See current complaints  Integumentary: Negative.  Physical Exam  Vitals & Measurements  HR:?68?(Peripheral)? BP:?132/73? HT:?152?cm? HT:?150?cm? WT:?90.71?kg? WT:?90.71?kg? BMI:?40.32?  Physical examination shows that the patient walks with an antalgic gait.? She uses no assistive device.? She has a moderate effusion in her right knee.? She is exquisitely tender on the medial joint line as well as on the pes anserine bursa.? The patient has a positive medial McMurrays test.? She has trace medial collateral ligament laxity.? She has no evidence of other ligamentous laxity.? She has patellofemoral and medial compartment crepitance.? Range of motion is 5-115? with pain.? She is motor and sensory intact.? She has no evidence of diabetic neuropathy.  ?  Standing AP, lateral, and sunrise view of the right knee?shows that the  patient has near end-stage medial compartment arthritis of the right knee.? She has subchondral sclerosis and periarticular osteophyte formation.  Assessment/Plan  1.?Right knee pain  The findings were reviewed with the patient and she expressed understanding.? I explained to her that given the nature of arthritis I am not sure if an arthroscopy would be very helpful for her.? She may need a partial knee replacement in the future?versus a total knee replacement?is at increased risk for deep vein thrombosis given her history.? The patient opted for an injection today.? She understands that this may elevate her capillary blood glucose.? The patients right knee was injected with dexamethasone 1 mL under sterile conditions. ?The patient tolerated the injection without difficulty.? I will see the patient back in 1 week for recheck.? She is instructed to call if she has any problems prior to her next appointment.  Ordered:  dexamethasone, 4 mg, Intra-Articular, Once, first dose 07/11/17 17:00:00 CDT, stop date 07/11/17 17:00:00 CDT, 24  asp/inj jnt/bursa, major [id number] PC, 07/11/17 16:59:00 CDT, RT, [facility] [place], Routine, 07/11/17 16:59:00 CDT  Clinic Follow up, *Est. 07/18/17 3:00:00 CDT, Order for future visit, Right knee pain  Primary osteoarthritis of right knee  Complex tear of medial meniscus of right knee, [facility] [place] [facility] [place]  Office/Outpatient Visit Level 3 New [id number] PC, Right knee pain  Primary osteoarthritis of right knee  Complex tear of medial meniscus of right knee, [facility] [place] [facility] [place], 07/11/17 16:59:00 CDT  ?  2.?Primary osteoarthritis of right knee  Ordered:  dexamethasone, 4 mg, Intra-Articular, Once, first dose 07/11/17 17:00:00 CDT, stop date 07/11/17 17:00:00 CDT, 24  asp/inj jnt/bursa, major [id number] PC, 07/11/17 16:59:00 CDT, RT, [facility] [place] [facility] [place], Routine, 07/11/17 16:59:00 CDT  Clinic Follow up, *Est. 07/18/17 3:00:00 CDT, Order for future visit, Right knee pain  Primary  osteoarthritis of right knee  Complex tear of medial meniscus of right knee, [facility]  Office/Outpatient Visit Level 3 New [id number] PC, Right knee pain  Primary osteoarthritis of right knee  Complex tear of medial meniscus of right knee, [facility] - VL[facility]LA[facility], 07/11/17 16:59:00 CDT  ?  3.?Complex tear of medial meniscus of right knee  Ordered:  dexamethasone, 4 mg, Intra-Articular, Once, first dose 07/11/17 17:00:00 CDT, stop date 07/11/17 17:00:00 CDT, 24  asp/inj jnt/bursa, major [id number] PC, 07/11/17 16:59:00 CDT, RT, [facility] - AORHETT [facility], Routine, 07/11/17 16:59:00 CDT  Clinic Follow up, *Est. 07/18/17 3:00:00 CDT, Order for future visit, Right knee pain  Primary osteoarthritis of right knee  Complex tear of medial meniscus of right knee, [facility]LA[facility]  Office/Outpatient Visit Level 3 New [id number] PC, Right knee pain  Primary osteoarthritis of right knee  Complex tear of medial meniscus of right knee, [facility] - AORHETT [facility], 07/11/17 16:59:00 CDT  ?   Problem List/Past Medical History  Chronic anemia  Complex tear of medial meniscus of right knee  DIABETES  GERD  HIGH CHOLESTEROL  Obesity  Primary osteoarthritis of right knee  Right knee pain  SLEEP APNEA  Historical  Anemia  CELLULITES OF THE LEFT ANKLE  DYSPHAGIA  hx of staph  Sepsis  Procedure/Surgical History  Exploration of tendon sheath of hand (10/03/2013), Release Trigger Finger Or Thumb (Left) (10/03/2013), Tendon sheath incision (eg, for trigger finger). (10/03/2013), Tendon sheath incision (eg, for trigger finger). (10/03/2013), Esophageal manometry (04/18/2013), Esophageal motility (manometric study of the esophagus and/or gastroesophageal junction) study with interpretation and report;. (04/18/2013), Biopsy of bone marrow (02/20/2013), BONE MARROW ASPIRATION PERFORMED WITH BONE MARROW BIOPSY THROUGH THE SAME INCISION ON THE SAME DATE OF SERVICE (02/20/2013), Bone marrow; biopsy, needle or trocar. (02/20/2013), Biopsy of tarsals and  metatarsals (2012), Esophagogastroduodenoscopy [EGD] with closed biopsy (2012), Central Venous Catheter Placement with Guidance (2012), bone spur removal, Carpal tunnel release, carpel tunnel both wrist,  delivery,  delivery delivered...., Cholecystectomy, Cholecystectomy, fundiplication, H/O: hysterectomy., Hernia repair, hernia repair, Hysterectomy sample, lt soulder and elbow surgery, nerve repair left elbow, Tonsillectomy, Ts and As - Tonsillectomy and adenoidectomy.  Medications  allopurinol 100 mg oral tablet  AMLODIPINE 5MG Tablets, 5 mg, 1 tab(s), Oral, Daily  Carafate 1 g/10 mL oral suspension  CLONIDINE HCL 0.2 MG TABLET, 0.2 mg, 1 tab(s), Oral, BID  dexamethasone, 4 mg, Intra-Articular, Once  FUROSEMIDE 20 MG TABLET  glimepiride 4 mg oral tablet, 4 mg, 1 tab(s), Oral, BID  HumaLOG  Lantus 100 U/mL (per pen)  LYRICA 75 MG CAPSULE, 75 mg, 1 cap(s), Oral, BID  metoprolol TARTrate 50 mg oral tablet (for Lopressor), 50 mg, 1 tab(s), Oral, Daily  Norco 10 mg-325 mg oral tablet, 1 tab(s), Oral, q4hr, PRN  PANTOPRAZOLE DR 40MG Tablets, 40 mg, 1 tab(s), Oral, Daily  Protonix, 40 mg, Oral, Daily  ranitidine 300 mg oral tablet, 1 tab, Oral, At Bedtime  Singulair 10 mg oral TABLET, 10 mg, 1 tab(s), Oral, Once  TNF Medl (Template NonFormulary)  TNF Medl (Template NonFormulary)  Tradjenta 5 mg oral tablet, 5 mg, 1 tab(s), Oral, Daily  TRIAMCINOLONE 0.1% CREAM  Zofran ODT 8 mg oral tablet, disintegrating, 8 mg, 1 tab(s), Oral, TID, PRN  Allergies  Contrast Dye?(Rash)  Lortab?(Itching)  codeine?(Itching)  iodine topical?(Rash)  morphine?(Tight chest)  vicryl suture  Social History  Alcohol - Denies Alcohol Use  Never  Employment/School - No Risk  Employed, Highest education level: University degree(s).  Exercise  Exercise frequency: Daily. Self assessment: Fair condition. Exercise type: Walking.  Home/Environment  Lives with Spouse.  Nutrition/Health  Diabetic  Substance Abuse - Denies  Substance Abuse  Never  Tobacco - Denies Tobacco Use  Never smoker  Family History  Alzheimers disease: Father.  CAD (coronary artery disease)....: Mother.  Primary malignant neoplasm of colon: Father.

## 2022-09-17 ENCOUNTER — HISTORICAL (OUTPATIENT)
Dept: ADMINISTRATIVE | Facility: HOSPITAL | Age: 67
End: 2022-09-17